# Patient Record
Sex: FEMALE | Race: BLACK OR AFRICAN AMERICAN | Employment: FULL TIME | URBAN - METROPOLITAN AREA
[De-identification: names, ages, dates, MRNs, and addresses within clinical notes are randomized per-mention and may not be internally consistent; named-entity substitution may affect disease eponyms.]

---

## 2017-01-23 ENCOUNTER — OFFICE VISIT (OUTPATIENT)
Dept: SURGERY | Age: 42
End: 2017-01-23

## 2017-01-23 VITALS
DIASTOLIC BLOOD PRESSURE: 70 MMHG | TEMPERATURE: 97.5 F | WEIGHT: 167 LBS | HEIGHT: 68 IN | BODY MASS INDEX: 25.31 KG/M2 | HEART RATE: 85 BPM | RESPIRATION RATE: 17 BRPM | SYSTOLIC BLOOD PRESSURE: 102 MMHG

## 2017-01-23 DIAGNOSIS — E66.3 OVERWEIGHT (BMI 25.0-29.9): Primary | ICD-10-CM

## 2017-01-23 RX ORDER — PHENTERMINE HYDROCHLORIDE 37.5 MG/1
37.5 TABLET ORAL
Qty: 30 TAB | Refills: 0 | Status: SHIPPED | OUTPATIENT
Start: 2017-01-23 | End: 2017-04-14 | Stop reason: DRUGHIGH

## 2017-01-23 NOTE — PROGRESS NOTES
Patient is a 46year old female presenting for follow up weight check. Patient reports she continues taking phentermine and would like to increase dosage. Patient reports having insomnia the first week of taking phentermine and some constipation and dry mouth. Patient has lost 1 lb since last visit 12/2016. Body mass index is 25.39 kg/(m^2).

## 2017-01-23 NOTE — PATIENT INSTRUCTIONS
If you have any question or concerns about today's appointment, the verbal and/or written instructions you were given for follow up care, please call our office at [phone].      [place] [facility], [address]  [place], [address]

## 2017-01-31 NOTE — PROGRESS NOTES
Pt returns after starting phentermine 15mg tabs x past month for ongoing weight management and prevention of regain after excellent weight loss with Marge Horan IGB. She notes experiencing 1 week of insomnia as well as some mild constipation, relieved by Mg+ supplement and increasing fluid. She has lost only 1lb in the past month and requests increase in dose of phentermine. Past Medical History   Diagnosis Date    Thyroid disease      hashimotos     Current Outpatient Prescriptions on File Prior to Visit   Medication Sig Dispense Refill    cholecalciferol, vitamin D3, 2,000 unit tab Take 6,000 Units by mouth daily. No current facility-administered medications on file prior to visit. Visit Vitals    /70    Pulse 85    Temp 97.5 °F (36.4 °C) (Oral)    Resp 17    Ht 5' 8\" (1.727 m)    Wt 75.8 kg (167 lb)    BMI 25.39 kg/m2     Weight Metrics 1/23/2017 12/27/2016 10/14/2016 9/26/2016 5/6/2016 4/14/2016 3/25/2016   Weight 167 lb 168 lb 169 lb 9.6 oz 172 lb 195 lb 208 lb 6 oz 205 lb   BMI 25.39 kg/m2 25.54 kg/m2 25.79 kg/m2 26.15 kg/m2 29.66 kg/m2 31.69 kg/m2 31.18 kg/m2   appears well    A/P: suboptimal response to phentermine 15mg, agree to increase to 37.5mg--half tab x 1 week, then may increase to 1/2 tab bid wit 2nd half no later than noon to avoid insomnia.   F/u 1 month, sooner prn  Counseling>50% of 15 minute visit  Germania Ruggiero PA-C

## 2017-04-14 ENCOUNTER — OFFICE VISIT (OUTPATIENT)
Dept: SURGERY | Age: 42
End: 2017-04-14

## 2017-04-14 VITALS
HEART RATE: 82 BPM | HEIGHT: 68 IN | TEMPERATURE: 98.5 F | OXYGEN SATURATION: 99 % | WEIGHT: 176 LBS | BODY MASS INDEX: 26.67 KG/M2 | SYSTOLIC BLOOD PRESSURE: 114 MMHG | RESPIRATION RATE: 16 BRPM | DIASTOLIC BLOOD PRESSURE: 82 MMHG

## 2017-04-14 DIAGNOSIS — E66.3 OVERWEIGHT (BMI 25.0-29.9): Primary | ICD-10-CM

## 2017-04-14 RX ORDER — PHENTERMINE HYDROCHLORIDE 15 MG/1
30 CAPSULE ORAL
Qty: 60 CAP | Refills: 0 | Status: SHIPPED | OUTPATIENT
Start: 2017-04-14 | End: 2017-09-11

## 2017-04-14 NOTE — MR AVS SNAPSHOT
Visit Information Date & Time Provider Department Dept. Phone Encounter #  
 4/14/2017  3:30 PM [address], PA-C [address] [id number] Your Appointments 5/25/2017  1:30 PM  
Follow Up with Julita Nasim Hardin RALEIGH Angelo 35 ([facility]) Appt Note: 6 week bariatric follow up [address]yes[address] [id number] [address] [facility] Upcoming Health Maintenance Date Due DTaP/Tdap/Td series (1 - Tdap) 6/20/1998 PAP AKA CERVICAL CYTOLOGY 10/24/2014 INFLUENZA AGE 9 TO ADULT 8/1/2016 Allergies as of 4/14/2017  Review Complete On: 4/14/2017 By: [address], PA-C No Known Allergies Current Immunizations  Never Reviewed No immunizations on file. Not reviewed this visit You Were Diagnosed With   
  
 Codes Comments Overweight (BMI 25.0-29.9)    -  Primary ICD-10-CM: R46.6 ICD-9-CM: 278.02 Vitals BP Pulse Temp Resp Height(growth percentile) Weight(growth percentile) 114/82 (BP 1 Location: Left arm, BP Patient Position: Sitting) 82 98.5 °F (36.9 °C) (Oral) 16 5' 8\" (1.727 m) 176 lb (79.8 kg) SpO2 BMI OB Status Smoking Status 99% 26.76 kg/m2 Having regular periods Former Smoker Vitals History BMI and BSA Data Body Mass Index Body Surface Area  
 26.76 kg/m 2 1.96 m 2 Preferred Pharmacy Pharmacy Name Phone [address] [facility], [address] [place] [address]  [phone] Your Updated Medication List  
  
   
This list is accurate as of: 4/14/17  3:49 PM.  Always use your most recent med list.  
  
  
  
  
 cholecalciferol (vitamin D3) 2,000 unit Tab Take 6,000 Units by mouth daily. phentermine 15 mg capsule Commonly known as:  ADIPEX_P  
 Take 2 Caps by mouth every morning. Max Daily Amount: 30 mg. Indications: WT LOSS MGMT, PT WITH BMI 27-29 & WT-RELATED COMORBIDITY Prescriptions Printed Refills  
 phentermine (ADIPEX_P) 15 mg capsule 0 Sig: Take 2 Caps by mouth every morning. Max Daily Amount: 30 mg. Indications: WT LOSS MGMT, PT WITH BMI 27-29 & WT-RELATED COMORBIDITY Class: Print Route: Oral  
  
Introducing [facility] & HEALTH SERVICES! Krzysztof Mckeon introduces [employer] patient portal. Now you can access parts of your medical record, email your doctor's office, and request medication refills online. 1. In your internet browser, go to [email or web address] 2. Click on the First Time User? Click Here link in the Sign In box. You will see the New Member Sign Up page. 3. Enter your [employer] Access Code exactly as it appears below. You will not need to use this code after youve completed the sign-up process. If you do not sign up before the expiration date, you must request a new code. · [employer] Access Code: [email or web address] Expires: 7/13/2017  3:49 PM 
 
4. Enter the last four digits of your Social Security Number (xxxx) and Date of Birth (mm/dd/yyyy) as indicated and click Submit. You will be taken to the next sign-up page. 5. Create a [employer] ID. This will be your [employer] login ID and cannot be changed, so think of one that is secure and easy to remember. 6. Create a [employer] password. You can change your password at any time. 7. Enter your Password Reset Question and Answer. This can be used at a later time if you forget your password. 8. Enter your e-mail address. You will receive e-mail notification when new information is available in [address] 9. Click Sign Up. You can now view and download portions of your medical record. 10. Click the Download Summary menu link to download a portable copy of your medical information.  
 
If you have questions, please visit the Frequently Asked Questions section of the [employer]. Remember, [employer] is NOT to be used for urgent needs. For medical emergencies, dial 911. Now available from your iPhone and Android! Please provide this summary of care documentation to your next provider. Your primary care clinician is listed as [facility] [place]. If you have any questions after today's visit, please call [phone].

## 2017-04-17 NOTE — PROGRESS NOTES
Pt returns for phentermine f/u visit. She hasn't been seen in nearly 2 months and is out of medication. She reports feeling some word finding difficulties on 37.5mg dose tablet and is requesting going back on the 15mg capsule--this was prescribed to her initially, but she did not feel appetite suppression as well and requested increased in dose. She is up 7lbs from her last appt, but she attributes this to starting her menses and retaining fluid. She denies any other SE on phentermine and desires restarting rx  She is 10 months s/p IGB placement and has several RD appts she has not utilized at this point. Past Medical History:   Diagnosis Date    Thyroid disease     hashimotos     Current Outpatient Prescriptions on File Prior to Visit   Medication Sig Dispense Refill    cholecalciferol, vitamin D3, 2,000 unit tab Take 6,000 Units by mouth daily. No current facility-administered medications on file prior to visit.       Weight Metrics 4/14/2017 1/23/2017 12/27/2016 10/14/2016 9/26/2016 5/6/2016 4/14/2016   Weight 176 lb 167 lb 168 lb 169 lb 9.6 oz 172 lb 195 lb 208 lb 6 oz   BMI 26.76 kg/m2 25.39 kg/m2 25.54 kg/m2 25.79 kg/m2 26.15 kg/m2 29.66 kg/m2 31.69 kg/m2     Visit Vitals    /82 (BP 1 Location: Left arm, BP Patient Position: Sitting)    Pulse 82    Temp 98.5 °F (36.9 °C) (Oral)    Resp 16    Ht 5' 8\" (1.727 m)    Wt 79.8 kg (176 lb)    SpO2 99%    BMI 26.76 kg/m2     Appears well    A/P: obesity, s/p recent weight loss with IGB, now on phentermine for continued loss/prevention of regain--agree to retry phentermine 15mg capsules--1-2/day prn appetite  Reviewed importance of low CHO diet and consistent exercise program  Advised she see RD for more detailed MNT--pt not desiring at this point  F/u 4-6 weeks, sooner prn  Germania Ruggiero PA-C

## 2017-05-26 ENCOUNTER — OFFICE VISIT (OUTPATIENT)
Dept: SURGERY | Age: 42
End: 2017-05-26

## 2017-05-26 VITALS
BODY MASS INDEX: 25.91 KG/M2 | WEIGHT: 171 LBS | RESPIRATION RATE: 16 BRPM | SYSTOLIC BLOOD PRESSURE: 128 MMHG | HEIGHT: 68 IN | DIASTOLIC BLOOD PRESSURE: 80 MMHG | HEART RATE: 64 BPM | TEMPERATURE: 97.6 F | OXYGEN SATURATION: 97 %

## 2017-05-26 DIAGNOSIS — E66.3 OVERWEIGHT (BMI 25.0-29.9): Primary | ICD-10-CM

## 2017-05-26 RX ORDER — PHENTERMINE HYDROCHLORIDE 15 MG/1
15 CAPSULE ORAL 2 TIMES DAILY
Qty: 60 CAP | Refills: 0 | Status: SHIPPED | OUTPATIENT
Start: 2017-05-26 | End: 2017-09-11

## 2017-05-26 NOTE — MR AVS SNAPSHOT
Visit Information Date & Time Provider Department Dept. Phone Encounter #  
 5/26/2017  1:00 PM Germania Levi PA-C [address] [id number] Your Appointments 6/26/2017  1:00 PM  
Follow Up with RALEIGH Munroe 33 ([address]) Appt Note: 1 month balloon f/up [address] [facility] Upcoming Health Maintenance Date Due DTaP/Tdap/Td series (1 - Tdap) 6/20/1998 PAP AKA CERVICAL CYTOLOGY 10/24/2014 INFLUENZA AGE 9 TO ADULT 8/1/2017 Allergies as of 5/26/2017  Review Complete On: 5/26/2017 By: Mitch Guan PA-C No Known Allergies Current Immunizations  Never Reviewed No immunizations on file. Not reviewed this visit You Were Diagnosed With   
  
 Codes Comments Overweight (BMI 25.0-29.9)    -  Primary ICD-10-CM: A89.7 ICD-9-CM: 278.02 Vitals BP Pulse Temp Resp Height(growth percentile) Weight(growth percentile) 128/80 64 97.6 °F (36.4 °C) 16 5' 8\" (1.727 m) 171 lb (77.6 kg) SpO2 BMI OB Status Smoking Status 97% 26 kg/m2 Having regular periods Never Smoker BMI and BSA Data Body Mass Index Body Surface Area  
 26 kg/m 2 1.93 m 2 Preferred Pharmacy Pharmacy Name Phone [address] [facility], [address] [place] [address] [phone] Your Updated Medication List  
  
   
This list is accurate as of: 5/26/17  1:58 PM.  Always use your most recent med list.  
  
  
  
  
 cholecalciferol (vitamin D3) 2,000 unit Tab Take 6,000 Units by mouth daily. * phentermine 15 mg capsule Commonly known as:  ADIPEX_P Take 2 Caps by mouth every morning. Max Daily Amount: 30 mg.  Indications: WT LOSS MGMT, PT WITH BMI 27-29 & WT-RELATED COMORBIDITY * phentermine 15 mg capsule Commonly known as:  ADIPEX_P Take 1 Cap by mouth two (2) times a day. Max Daily Amount: 30 mg. Indications: WT LOSS MGMT, PT WITH BMI 27-29 & WT-RELATED COMORBIDITY * Notice: This list has 2 medication(s) that are the same as other medications prescribed for you. Read the directions carefully, and ask your doctor or other care provider to review them with you. Prescriptions Printed Refills  
 phentermine (ADIPEX_P) 15 mg capsule 0 Sig: Take 1 Cap by mouth two (2) times a day. Max Daily Amount: 30 mg. Indications: WT LOSS MGMT, PT WITH BMI 27-29 & WT-RELATED COMORBIDITY Class: Print Route: Oral  
  
Introducing [facility] & HEALTH SERVICES! Cee Cisneros introduces [employer] patient portal. Now you can access parts of your medical record, email your doctor's office, and request medication refills online. 1. In your internet browser, go to [email or web address] 2. Click on the First Time User? Click Here link in the Sign In box. You will see the New Member Sign Up page. 3. Enter your [employer] Access Code exactly as it appears below. You will not need to use this code after youve completed the sign-up process. If you do not sign up before the expiration date, you must request a new code. · [employer] Access Code: [email or web address] Expires: 7/13/2017  3:49 PM 
 
4. Enter the last four digits of your Social Security Number (xxxx) and Date of Birth (mm/dd/yyyy) as indicated and click Submit. You will be taken to the next sign-up page. 5. Create a [employer] ID. This will be your [employer] login ID and cannot be changed, so think of one that is secure and easy to remember. 6. Create a [employer] password. You can change your password at any time. 7. Enter your Password Reset Question and Answer. This can be used at a later time if you forget your password. 8. Enter your e-mail address. You will receive e-mail notification when new information is available in [address] 9. Click Sign Up. You can now view and download portions of your medical record. 10. Click the Download Summary menu link to download a portable copy of your medical information. If you have questions, please visit the Frequently Asked Questions section of the [employer] website. Remember, [employer] is NOT to be used for urgent needs. For medical emergencies, dial 911. Now available from your iPhone and Android! Please provide this summary of care documentation to your next provider. Your primary care clinician is listed as [facility] [place]. If you have any questions after today's visit, please call [phone].

## 2017-05-26 NOTE — PROGRESS NOTES
Here for monthly phentermine f/u. Has been taking capsules 15mg bid with good appetite suppression and no SE other than dry mouth. She has lost most of the weight she had gained at last visit. She has been sporadic with low CHO diet, but making efforts to eat meats/veggies/fats    Past Medical History:   Diagnosis Date    Thyroid disease     hashimotos     Current Outpatient Prescriptions on File Prior to Visit   Medication Sig Dispense Refill    phentermine (ADIPEX_P) 15 mg capsule Take 2 Caps by mouth every morning. Max Daily Amount: 30 mg. Indications: WT LOSS MGMT, PT WITH BMI 27-29 & WT-RELATED COMORBIDITY 60 Cap 0    cholecalciferol, vitamin D3, 2,000 unit tab Take 6,000 Units by mouth daily. No current facility-administered medications on file prior to visit. Weight Metrics 5/26/2017 4/14/2017 1/23/2017 12/27/2016 10/14/2016 9/26/2016 5/6/2016   Weight 171 lb 176 lb 167 lb 168 lb 169 lb 9.6 oz 172 lb 195 lb   BMI 26 kg/m2 26.76 kg/m2 25.39 kg/m2 25.54 kg/m2 25.79 kg/m2 26.15 kg/m2 29.66 kg/m2     Visit Vitals    /80    Pulse 64    Temp 97.6 °F (36.4 °C)    Resp 16    Ht 5' 8\" (1.727 m)    Wt 77.6 kg (171 lb)    SpO2 97%    BMI 26 kg/m2     Appears well    A/P: doing well on phentermine capsules 15mg bid--ok to rf.   Advise consistent low CHO (aim for <60gm/day)  Cont exercise program--suggest measurements  F/u 1 month, sooner prn  Counseling>50% of 15 minute visit  Germania Ruggiero PA-C

## 2017-06-26 ENCOUNTER — OFFICE VISIT (OUTPATIENT)
Dept: SURGERY | Age: 42
End: 2017-06-26

## 2017-06-26 VITALS
BODY MASS INDEX: 26.86 KG/M2 | TEMPERATURE: 97.6 F | HEART RATE: 74 BPM | OXYGEN SATURATION: 99 % | SYSTOLIC BLOOD PRESSURE: 110 MMHG | WEIGHT: 177.2 LBS | DIASTOLIC BLOOD PRESSURE: 70 MMHG | HEIGHT: 68 IN | RESPIRATION RATE: 16 BRPM

## 2017-06-26 DIAGNOSIS — E66.3 OVERWEIGHT (BMI 25.0-29.9): Primary | ICD-10-CM

## 2017-06-26 RX ORDER — PHENTERMINE HYDROCHLORIDE 15 MG/1
15 CAPSULE ORAL 2 TIMES DAILY
Qty: 60 CAP | Refills: 0 | Status: SHIPPED | OUTPATIENT
Start: 2017-06-26 | End: 2017-09-11

## 2017-06-26 NOTE — PROGRESS NOTES
Pt returns for monthly phentermine f/u. Vacationed in [place] and indulged in any food she desired plus EtOH and weight up 6lbs this month. She denies SE on 15mg capsules bid. She had been following low CHO/ moderate fat and protein diet and \"felt great\". Since changing diet back to high CHO she notices more abd bloating, skin change and decreased energy. She is eager to restart low CHO diet and exercise program. She desires rf phentermine at least for 1 more month to assist with getting back on track. Past Medical History:   Diagnosis Date    Thyroid disease     hashimotos     Current Outpatient Prescriptions on File Prior to Visit   Medication Sig Dispense Refill    phentermine (ADIPEX_P) 15 mg capsule Take 1 Cap by mouth two (2) times a day. Max Daily Amount: 30 mg. Indications: WT LOSS MGMT, PT WITH BMI 27-29 & WT-RELATED COMORBIDITY 60 Cap 0    phentermine (ADIPEX_P) 15 mg capsule Take 2 Caps by mouth every morning. Max Daily Amount: 30 mg. Indications: WT LOSS MGMT, PT WITH BMI 27-29 & WT-RELATED COMORBIDITY 60 Cap 0    cholecalciferol, vitamin D3, 2,000 unit tab Take 6,000 Units by mouth daily. No current facility-administered medications on file prior to visit. Weight Metrics 6/26/2017 5/26/2017 4/14/2017 1/23/2017 12/27/2016 10/14/2016 9/26/2016   Weight 177 lb 3.2 oz 171 lb 176 lb 167 lb 168 lb 169 lb 9.6 oz 172 lb   BMI 26.94 kg/m2 26 kg/m2 26.76 kg/m2 25.39 kg/m2 25.54 kg/m2 25.79 kg/m2 26.15 kg/m2     Visit Vitals    /70 (BP 1 Location: Left arm, BP Patient Position: Sitting)    Pulse 74    Temp 97.6 °F (36.4 °C) (Oral)    Resp 16    Ht 5' 8\" (1.727 m)    Wt 80.4 kg (177 lb 3.2 oz)    SpO2 99%    BMI 26.94 kg/m2     Appears well    A/P: monthly phentermine f/u--weight gain this month. OK for rf 15mg bid capsules. Encouraged restart low CHO diet as felt noticeably better overall.  If weight decreased next month, will consider reduced dose phentermine 15mg every day  F/u 1 month, sooner kris Ruggiero PA-C

## 2017-06-26 NOTE — PROGRESS NOTES
Patient is a 38 female who presents for a bariatric follow up evaluation for an EGD with placement of Oberra intra-gastric balloon done on 04/14/16 & removal done on 10/14/16. Her BMI today is 26.94 kg (m^2). She is currently taking Phentermine & tolerating it well. 1. Have you been to the ER, urgent care clinic since your last visit? Hospitalized since your last visit? No    2. Have you seen or consulted any other health care providers outside of the [address] [place] [address] since your last visit? Include any pap smears or colon screening.  No

## 2017-07-28 ENCOUNTER — OFFICE VISIT (OUTPATIENT)
Dept: SURGERY | Age: 42
End: 2017-07-28

## 2017-07-28 VITALS
HEART RATE: 72 BPM | TEMPERATURE: 98.6 F | RESPIRATION RATE: 16 BRPM | SYSTOLIC BLOOD PRESSURE: 128 MMHG | DIASTOLIC BLOOD PRESSURE: 82 MMHG | WEIGHT: 172 LBS | HEIGHT: 68 IN | BODY MASS INDEX: 26.07 KG/M2

## 2017-07-28 DIAGNOSIS — E66.3 OVERWEIGHT (BMI 25.0-29.9): Primary | ICD-10-CM

## 2017-07-28 RX ORDER — PHENTERMINE HYDROCHLORIDE 15 MG/1
15 CAPSULE ORAL 2 TIMES DAILY
Qty: 60 CAP | Refills: 0 | Status: SHIPPED | OUTPATIENT
Start: 2017-07-28 | End: 2017-09-11

## 2017-07-28 NOTE — PROGRESS NOTES
Ms. Bryon Silverman returns to clinic for her monthly follow-up while on phentermine for obesity management. She continues to take phentermine capsules 15 mg twice daily and denies side effects. At her last visit her weight was up, as she was on vacation and went off the plan. Today she notes increasing her physical activity to include body pump and other aerobic exercise classes, as well as following the low-carb diet as closely as possible. Her macronutrient calculator on her phone demonstrates an average daily carbohydrate gram intake of 70 g per day. She has lost 5 pounds this month and is pleased with this, however overall she is frustrated that she is not reaching her goal weight of 160 pounds. She has lots of questions about other available medications for weight loss. Past Medical History:   Diagnosis Date    Thyroid disease     hashimotos     Current Outpatient Prescriptions on File Prior to Visit   Medication Sig Dispense Refill    phentermine (ADIPEX_P) 15 mg capsule Take 2 Caps by mouth every morning. Max Daily Amount: 30 mg. Indications: WT LOSS MGMT, PT WITH BMI 27-29 & WT-RELATED COMORBIDITY 60 Cap 0    cholecalciferol, vitamin D3, 2,000 unit tab Take 6,000 Units by mouth daily.  phentermine (ADIPEX_P) 15 mg capsule Take 1 Cap by mouth two (2) times a day. Max Daily Amount: 30 mg. Indications: WT LOSS MGMT, PT WITH BMI 27-29 & WT-RELATED COMORBIDITY 60 Cap 0    phentermine (ADIPEX_P) 15 mg capsule Take 1 Cap by mouth two (2) times a day. Max Daily Amount: 30 mg. Indications: WT LOSS MGMT, PT WITH BMI 27-29 & WT-RELATED COMORBIDITY 60 Cap 0     No current facility-administered medications on file prior to visit.       Weight Metrics 7/28/2017 6/26/2017 5/26/2017 4/14/2017 1/23/2017 12/27/2016 10/14/2016   Weight 172 lb 177 lb 3.2 oz 171 lb 176 lb 167 lb 168 lb 169 lb 9.6 oz   BMI 26.15 kg/m2 26.94 kg/m2 26 kg/m2 26.76 kg/m2 25.39 kg/m2 25.54 kg/m2 25.79 kg/m2     Visit Vitals    /82    Pulse 72    Temp 98.6 °F (37 °C)    Resp 16    Ht 5' 8\" (1.727 m)    Wt 78 kg (172 lb)    BMI 26.15 kg/m2     Appears well    A/P: Monthly follow-up for phentermine management, doing well this month sticking to low-carb diet and aggressive exercise program.  Lengthy discussion reviewing all available medications for weight loss, including mechanisms of action and potential side effects, as well as costs. Patient desires holding off on changing medications at this time, but agrees to accept package insert materials regarding Belviq. I agreed to give her refill of phentermine 15 mg twice daily for this month, and we will discuss the possibility of reducing phentermine to 15 mg daily and adding Belviq 20 mg extended release next month if she desires.   Counseling greater than 50% of 15 minute visit  Follow-up one month sooner as needed  Germania Ruggiero PA-C

## 2017-08-25 ENCOUNTER — OFFICE VISIT (OUTPATIENT)
Dept: SURGERY | Age: 42
End: 2017-08-25

## 2017-08-25 VITALS
BODY MASS INDEX: 26.52 KG/M2 | HEART RATE: 75 BPM | TEMPERATURE: 98.7 F | HEIGHT: 68 IN | WEIGHT: 175 LBS | RESPIRATION RATE: 16 BRPM | OXYGEN SATURATION: 99 % | SYSTOLIC BLOOD PRESSURE: 100 MMHG | DIASTOLIC BLOOD PRESSURE: 72 MMHG

## 2017-08-25 DIAGNOSIS — E66.3 OVERWEIGHT (BMI 25.0-29.9): Primary | ICD-10-CM

## 2017-08-25 PROCEDURE — 99213 OFFICE O/P EST LOW 20 MIN: CPT | Performed by: PHYSICIAN ASSISTANT

## 2017-08-25 NOTE — PROGRESS NOTES
Ms. Nimisha Cavanaugh returns to the office for her monthly follow-up visit/phentermine medication check. She has been taking phentermine for several months, and decided 2 weeks ago that she did not want to take it any longer. She reports feeling urinary urgency and feeling mentally scattered and unfocused while taking medication. She has never reported the side effects before but believes they are related to the phentermine, as she has not experienced them for the past 2 weeks since stopping the medication. She remains frustrated with her stalled weight loss, and desires losing an additional 10-15 pounds. Patient had an intragastric balloon placed April 14, 2016 with starting weight of 205 pounds, and achieved a jamil of 172 6 months later. We started working together since that time with the goal of prevention of weight regain. Ms. Nimisha Cavanaugh continues to follow a low carbohydrate diet, generally keeping her daily carb grams under 50, and on her \"bad days\" under 100. In addition she is exercising regularly, doing cardio and resistance training. She feels leaner overall, and has no medical problems. She is asking about additional medications for weight loss including Saxenda. Past Medical History:   Diagnosis Date    Thyroid disease     hashimotos     Patient Active Problem List   Diagnosis Code    Overweight (BMI 25.0-29. 9) E66.3     Current Outpatient Prescriptions on File Prior to Visit   Medication Sig Dispense Refill    phentermine (ADIPEX_P) 15 mg capsule Take 1 Cap by mouth two (2) times a day. Max Daily Amount: 30 mg. Indications: WT LOSS MGMT, PT WITH BMI 27-29 & WT-RELATED COMORBIDITY 60 Cap 0    phentermine (ADIPEX_P) 15 mg capsule Take 1 Cap by mouth two (2) times a day. Max Daily Amount: 30 mg. Indications: WT LOSS MGMT, PT WITH BMI 27-29 & WT-RELATED COMORBIDITY 60 Cap 0    phentermine (ADIPEX_P) 15 mg capsule Take 1 Cap by mouth two (2) times a day. Max Daily Amount: 30 mg.  Indications: WT LOSS MGMT, PT WITH BMI 27-29 & WT-RELATED COMORBIDITY 60 Cap 0    phentermine (ADIPEX_P) 15 mg capsule Take 2 Caps by mouth every morning. Max Daily Amount: 30 mg. Indications: WT LOSS MGMT, PT WITH BMI 27-29 & WT-RELATED COMORBIDITY 60 Cap 0    cholecalciferol, vitamin D3, 2,000 unit tab Take 6,000 Units by mouth daily. No current facility-administered medications on file prior to visit. Weight Metrics 8/25/2017 7/28/2017 6/26/2017 5/26/2017 4/14/2017 1/23/2017 12/27/2016   Weight 175 lb 172 lb 177 lb 3.2 oz 171 lb 176 lb 167 lb 168 lb   BMI 26.61 kg/m2 26.15 kg/m2 26.94 kg/m2 26 kg/m2 26.76 kg/m2 25.39 kg/m2 25.54 kg/m2     Visit Vitals    /72    Pulse 75    Temp 98.7 °F (37.1 °C) (Oral)    Resp 16    Ht 5' 8\" (1.727 m)    Wt 79.4 kg (175 lb)    SpO2 99%    BMI 26.61 kg/m2     Appears well    A/P: Ms. Ramses Murdock is approximately 1 year status post removal of intragastric balloon, with maintenance of lost weight. She has decided to stop taking phentermine for weight loss due to side effects, and I support her in this decision. We had a lengthy discussion regarding alternative pharmacotherapy for weight loss, however the financial cost, mechanism of action, and side effect profile are not warranted by her current BMI of 26. I have applauded her in changing her lifestyle, and have encouraged her to continue her diet and exercise efforts, with the potential for this to be sufficient in weight maintenance. We may consider DEXA body composition measurements when they become available at our institution, as I feel she may have a normal body composition. We agreed to follow-up in 2 months for a weight check, with possible reintroduction of pharmacotherapy if necessary.   Counseling greater than 50% of 15 minute visit  Germania Ruggiero PA-C

## 2017-09-11 ENCOUNTER — OFFICE VISIT (OUTPATIENT)
Dept: SURGERY | Age: 42
End: 2017-09-11

## 2017-09-11 VITALS
TEMPERATURE: 98.1 F | BODY MASS INDEX: 26.98 KG/M2 | DIASTOLIC BLOOD PRESSURE: 82 MMHG | SYSTOLIC BLOOD PRESSURE: 112 MMHG | HEIGHT: 68 IN | RESPIRATION RATE: 16 BRPM | WEIGHT: 178 LBS | HEART RATE: 68 BPM

## 2017-09-11 DIAGNOSIS — E66.3 OVERWEIGHT (BMI 25.0-29.9): Primary | ICD-10-CM

## 2017-09-11 RX ORDER — BISMUTH SUBSALICYLATE 262 MG
1 TABLET,CHEWABLE ORAL DAILY
COMMUNITY

## 2017-09-14 NOTE — PROGRESS NOTES
Patient returns for obesity management. We have been working together for many months using pharmacotherapy for weight loss (phentermine). Patient has discontinued use of phentermine and has requested initiation of Saxenda. She has done extensive research on this medication, is aware of its costs, as well as side effects, and desires starting therapy. There is been no change to her past medical history, and she denies history of thyroid cancer in herself or any family members. She is aware this requires subcutaneous injections, and is here for teaching. Past Medical History:   Diagnosis Date    Thyroid disease     hashimotos     Patient Active Problem List   Diagnosis Code    Overweight (BMI 25.0-29. 9) E66.3     Current Outpatient Prescriptions on File Prior to Visit   Medication Sig Dispense Refill    cholecalciferol, vitamin D3, 2,000 unit tab Take 6,000 Units by mouth daily. No current facility-administered medications on file prior to visit. Weight Metrics 9/11/2017 8/25/2017 7/28/2017 6/26/2017 5/26/2017 4/14/2017 1/23/2017   Weight 178 lb 175 lb 172 lb 177 lb 3.2 oz 171 lb 176 lb 167 lb   BMI 27.06 kg/m2 26.61 kg/m2 26.15 kg/m2 26.94 kg/m2 26 kg/m2 26.76 kg/m2 25.39 kg/m2     Visit Vitals    /82    Pulse 68    Temp 98.1 °F (36.7 °C)    Resp 16    Ht 5' 8\" (1.727 m)    Wt 80.7 kg (178 lb)    BMI 27.06 kg/m2     Appears well    A/P: Patient with obesity, okay to start Saxenda 0.6 mg subcu daily ×1 week, with ability to increase dose by 0.6 mg per day for a week at a time (max dose 3 mg per day). Reviewed dose-related nausea--patient aware  Did hands-on teaching of subcutaneous injection with patient.   She successfully self injected sterile saline into the subcu tissue of her abdomen without complication  Rx given  10% of 30 minute visit spent in counseling and teaching  Follow-up 1 month, sooner as needed  Germania Ruggiero PA-C

## 2017-11-03 ENCOUNTER — OFFICE VISIT (OUTPATIENT)
Dept: SURGERY | Age: 42
End: 2017-11-03

## 2017-11-03 VITALS
RESPIRATION RATE: 16 BRPM | WEIGHT: 170 LBS | HEART RATE: 64 BPM | SYSTOLIC BLOOD PRESSURE: 102 MMHG | HEIGHT: 68 IN | BODY MASS INDEX: 25.76 KG/M2 | TEMPERATURE: 97.7 F | DIASTOLIC BLOOD PRESSURE: 74 MMHG

## 2017-11-03 DIAGNOSIS — E66.3 OVERWEIGHT (BMI 25.0-29.9): Primary | ICD-10-CM

## 2017-11-06 ENCOUNTER — IMPORTED ENCOUNTER (OUTPATIENT)
Dept: URBAN - METROPOLITAN AREA CLINIC 1 | Facility: CLINIC | Age: 42
End: 2017-11-06

## 2017-11-06 PROBLEM — H52.13: Noted: 2017-11-06

## 2017-11-06 PROCEDURE — S0621 ROUTINE OPHTHALMOLOGICAL EXA: HCPCS

## 2017-11-06 NOTE — PROGRESS NOTES
Ms. Tera Hill returns to the office for medication check. She started [place] for obesity management, and prevention of weight regain after intragastric balloon 1 month ago. She started with the lowest starting dose and has worked up to 3 mg daily subcu. She reports minimal nausea, however feels decreased appetite and has lost 8 pounds in the last month. She is very pleased with this medication and desires continuation. Past Medical History:   Diagnosis Date    Thyroid disease     hashimotos     Current Outpatient Prescriptions on File Prior to Visit   Medication Sig Dispense Refill    multivitamin (ONE A DAY) tablet Take 1 Tab by mouth daily.  liraglutide (SAXENDA) 3 mg/0.5 mL (18 mg/3 mL) pen 0.1 mL by SubCUTAneous route daily. Indications: WT LOSS MGMT, PT WITH BMI 27-29 & WT-RELATED COMORBIDITY 1 Adjustable Dose Pre-filled Pen Syringe 3    cholecalciferol, vitamin D3, 2,000 unit tab Take 6,000 Units by mouth daily. No current facility-administered medications on file prior to visit. Weight Metrics 11/3/2017 9/11/2017 8/25/2017 7/28/2017 6/26/2017 5/26/2017 4/14/2017   Weight 170 lb 178 lb 175 lb 172 lb 177 lb 3.2 oz 171 lb 176 lb   BMI 25.85 kg/m2 27.06 kg/m2 26.61 kg/m2 26.15 kg/m2 26.94 kg/m2 26 kg/m2 26.76 kg/m2     Visit Vitals    /74    Pulse 64    Temp 97.7 °F (36.5 °C)    Resp 16    Ht 5' 8\" (1.727 m)    Wt 77.1 kg (170 lb)    BMI 25.85 kg/m2     Appears well    A/P: Obesity, currently controlled with daily Saxenda--okay to continue Rx. Reinforced low carbohydrate diet and exercise for at least 150 minutes a week.   Follow-up 6-8 weeks, weight check, sooner as needed  Counseling greater than 50% of 15 minute visit  Germania Ruggiero PA-C

## 2017-11-06 NOTE — PATIENT DISCUSSION
1. Myopia: Rx was given for correction if indicated and requested. 2. Return for an appointment in 1 year for 40 and Contact lens check. with Dr. Lisa Paredes.

## 2017-12-20 ENCOUNTER — OFFICE VISIT (OUTPATIENT)
Dept: SURGERY | Age: 42
End: 2017-12-20

## 2017-12-20 VITALS
HEART RATE: 68 BPM | DIASTOLIC BLOOD PRESSURE: 78 MMHG | TEMPERATURE: 98.4 F | RESPIRATION RATE: 16 BRPM | HEIGHT: 68 IN | BODY MASS INDEX: 25.76 KG/M2 | WEIGHT: 170 LBS | SYSTOLIC BLOOD PRESSURE: 112 MMHG

## 2017-12-20 DIAGNOSIS — E66.3 OVERWEIGHT (BMI 25.0-29.9): Primary | ICD-10-CM

## 2017-12-20 NOTE — PROGRESS NOTES
Patient returns for follow-up/weight check, last seen 11/3/2017 with excellent result on Saxenda 3 mg daily subcu for obesity. She notes continuing to take medication without any complications or problems. She has been weight stable since our last appointment, and attributes this to being unable to exercise since having a breast surgery early November 2017. She notes if she eats too much or has increased portion sizes she gets nauseated, and therefore stops before she is overly full. She is very pleased with this medication and desires continuing. Past Medical History:   Diagnosis Date    Thyroid disease     hashimotos     Patient Active Problem List   Diagnosis Code    Overweight (BMI 25.0-29. 9) E66.3     Weight Metrics 12/20/2017 11/3/2017 9/11/2017 8/25/2017 7/28/2017 6/26/2017 5/26/2017   Weight 170 lb 170 lb 178 lb 175 lb 172 lb 177 lb 3.2 oz 171 lb   BMI 25.85 kg/m2 25.85 kg/m2 27.06 kg/m2 26.61 kg/m2 26.15 kg/m2 26.94 kg/m2 26 kg/m2     Visit Vitals    /78    Pulse 68    Temp 98.4 °F (36.9 °C)    Resp 16    Ht 5' 8\" (1.727 m)    Wt 77.1 kg (170 lb)    BMI 25.85 kg/m2     Appears well    A/P: Excellent continued result with weight loss on Saxenda 3 mg subcu daily. Patient weight stable, but unable to exercise due to recent breast surgery. Okay to continue Rx  Reinforced importance of following low carbohydrate diet, and achieving 150 minutes a week of exercise when able to do so.   Counseling greater than 50% of 15 minute visit  Follow-up 2-3 months, sooner as needed  Germania Ruggiero PA-C

## 2018-04-13 ENCOUNTER — OFFICE VISIT (OUTPATIENT)
Dept: SURGERY | Age: 43
End: 2018-04-13

## 2018-04-13 VITALS
RESPIRATION RATE: 17 BRPM | TEMPERATURE: 98.7 F | HEIGHT: 68 IN | HEART RATE: 73 BPM | OXYGEN SATURATION: 99 % | DIASTOLIC BLOOD PRESSURE: 68 MMHG | WEIGHT: 173 LBS | SYSTOLIC BLOOD PRESSURE: 132 MMHG | BODY MASS INDEX: 26.22 KG/M2

## 2018-04-13 DIAGNOSIS — E66.3 OVERWEIGHT (BMI 25.0-29.9): Primary | ICD-10-CM

## 2018-04-13 NOTE — PROGRESS NOTES
Continues to do well on Saxenda 3mg every day Sub q injection--denies SE or complications. 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 Diet and exercise habits are appropriate and consistent. She desires remaining on Saxenda for time being. Past Medical History:   Diagnosis Date    Thyroid disease     hashimotos     Patient Active Problem List   Diagnosis Code    Overweight (BMI 25.0-29. 9) E66.3     Weight Metrics 4/13/2018 12/20/2017 11/3/2017 9/11/2017 8/25/2017 7/28/2017 6/26/2017   Weight 173 lb 170 lb 170 lb 178 lb 175 lb 172 lb 177 lb 3.2 oz   BMI 26.3 kg/m2 25.85 kg/m2 25.85 kg/m2 27.06 kg/m2 26.61 kg/m2 26.15 kg/m2 26.94 kg/m2     Current Outpatient Prescriptions on File Prior to Visit   Medication Sig Dispense Refill    multivitamin (ONE A DAY) tablet Take 1 Tab by mouth daily.  liraglutide (SAXENDA) 3 mg/0.5 mL (18 mg/3 mL) pen 0.1 mL by SubCUTAneous route daily. Indications: WT LOSS MGMT, PT WITH BMI 27-29 & WT-RELATED COMORBIDITY 1 Adjustable Dose Pre-filled Pen Syringe 3    cholecalciferol, vitamin D3, 2,000 unit tab Take 6,000 Units by mouth daily. No current facility-administered medications on file prior to visit.       Visit Vitals    /68    Pulse 73    Temp 98.7 °F (37.1 °C) (Oral)    Resp 17    Ht 5' 8\" (1.727 m)    Wt 78.5 kg (173 lb)    SpO2 99%    BMI 26.3 kg/m2     Appears well    A/P: Doing well on Saxenda long term s/p Intragastric balloon placement and removal. Cont low CHO diet and exercise 150 minutes/wk  Sent rf Saxenda 3mg every day to pt's pharmacy rf x 3  Counseling>50% of 15 minute visit  F/u 3 months, sooner prn  Germania Ruggiero PA-C

## 2018-11-12 ENCOUNTER — IMPORTED ENCOUNTER (OUTPATIENT)
Dept: URBAN - METROPOLITAN AREA CLINIC 1 | Facility: CLINIC | Age: 43
End: 2018-11-12

## 2018-11-12 PROBLEM — H52.13: Noted: 2018-11-12

## 2018-11-12 PROCEDURE — S0621 ROUTINE OPHTHALMOLOGICAL EXA: HCPCS

## 2018-11-12 NOTE — PATIENT DISCUSSION
1. Myopia: Rx was given for correction if indicated and requested. 2. [place] OU-REC patient to increase use of AT up to QID w/ or w/out CLS 3. Return for an appointment in 1 year for 40 and Contact lens check. with Dr. Christa Cartagena.

## 2019-02-11 ENCOUNTER — OFFICE VISIT (OUTPATIENT)
Dept: SURGERY | Age: 44
End: 2019-02-11

## 2019-02-11 VITALS
TEMPERATURE: 95.9 F | BODY MASS INDEX: 26.98 KG/M2 | DIASTOLIC BLOOD PRESSURE: 79 MMHG | WEIGHT: 178 LBS | HEIGHT: 68 IN | SYSTOLIC BLOOD PRESSURE: 127 MMHG | OXYGEN SATURATION: 99 % | HEART RATE: 74 BPM

## 2019-02-11 DIAGNOSIS — D64.9 ANEMIA, UNSPECIFIED TYPE: ICD-10-CM

## 2019-02-11 DIAGNOSIS — Z71.3 WEIGHT LOSS COUNSELING, ENCOUNTER FOR: ICD-10-CM

## 2019-02-11 DIAGNOSIS — Z72.4 INAPPROPRIATE DIET AND EATING HABITS: ICD-10-CM

## 2019-02-11 DIAGNOSIS — E66.3 OVERWEIGHT (BMI 25.0-29.9): Primary | ICD-10-CM

## 2019-02-11 DIAGNOSIS — R53.83 FATIGUE, UNSPECIFIED TYPE: ICD-10-CM

## 2019-02-11 NOTE — PROGRESS NOTES
Shirley Treadwell is a 41 y.o. female (: 1975) presenting to address:    Chief Complaint   Patient presents with    Weight Management       Medication list and allergies have been reviewed with Shirley Treadwell and updated as of today's date. I have gone over all Medical, Surgical and Social History with Shirley Treadwell and updated/added the information accordingly.

## 2019-02-11 NOTE — PROGRESS NOTES
Had taken self off of Saxenda and gained 15 lbs in 4 months, at which time she restarted and has been successful in losing the weight she has previously gained. She is now just 5lbs up from her last weight with us. She was last seen by HUGO Ruggiero. She notes she continues to do well on Saxenda 3mg every day Sub q injection--denies SE or complications. When she restarted she did increase her dose gradually as directions instructed and has mild nausea that was tolerable. She has resumed diet and exercise habits that appropriate and consistent with what she was doing before. 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 She desires a refill for Saxenda. Past Medical History:   Diagnosis Date    Thyroid disease     hashimotos     Patient Active Problem List   Diagnosis Code    Overweight (BMI 25.0-29. 9) E66.3     Weight Metrics 2/11/2019 4/13/2018 12/20/2017 11/3/2017 9/11/2017 8/25/2017 7/28/2017   Weight 178 lb 173 lb 170 lb 170 lb 178 lb 175 lb 172 lb   BMI 27.06 kg/m2 26.3 kg/m2 25.85 kg/m2 25.85 kg/m2 27.06 kg/m2 26.61 kg/m2 26.15 kg/m2     Current Outpatient Medications on File Prior to Visit   Medication Sig Dispense Refill    pen needle, diabetic (NOVOFINE PLUS) 32 gauge x 1/6\" ndle Needle to be used for rx of saxenda 100 Pen Needle 0    multivitamin (ONE A DAY) tablet Take 1 Tab by mouth daily.  cholecalciferol, vitamin D3, 2,000 unit tab Take 6,000 Units by mouth daily.  liraglutide (SAXENDA) 3 mg/0.5 mL (18 mg/3 mL) pen 0.5 mL by SubCUTAneous route daily. Indications: WT LOSS MGMT, PT WITH BMI 27-29 & WT-RELATED COMORBIDITY 1 Adjustable Dose Pre-filled Pen Syringe 3    liraglutide (SAXENDA) 3 mg/0.5 mL (18 mg/3 mL) pen 0.1 mL by SubCUTAneous route daily. Indications: WT LOSS MGMT, PT WITH BMI 27-29 & WT-RELATED COMORBIDITY 1 Adjustable Dose Pre-filled Pen Syringe 3     No current facility-administered medications on file prior to visit.       Review of Systems   Gastrointestinal: Positive for nausea. Negative for abdominal pain, constipation, diarrhea, heartburn and vomiting. All other systems reviewed and are negative. Visit Vitals  /79 (BP 1 Location: Right arm, BP Patient Position: Sitting)   Pulse 74   Temp 95.9 °F (35.5 °C) (Oral)   Ht 5' 8\" (1.727 m)   Wt 80.7 kg (178 lb)   SpO2 99%   BMI 27.06 kg/m²     Physical Exam   Constitutional: She is oriented to person, place, and time and well-developed, well-nourished, and in no distress. HENT:   Head: Normocephalic. Cardiovascular: Normal rate and normal heart sounds. Pulmonary/Chest: Effort normal and breath sounds normal.   Abdominal: Soft. Bowel sounds are normal. She exhibits no distension. There is no tenderness. Musculoskeletal: Normal range of motion. Neurological: She is alert and oriented to person, place, and time. Skin: Skin is warm and dry. Psychiatric: Affect normal.   Nursing note and vitals reviewed. A/P: Doing well on Saxenda long term s/p Intragastric balloon placement and removal. Cont low CHO diet and exercise 150 minutes/wk  Sent rf Saxenda 3mg every day to pt's pharmacy rf x 3  Counseling>50% of 30 minute visit  Labs pending.      F/u 3 months, sooner prn  Zhou Marroquin NP

## 2019-02-16 ENCOUNTER — HOSPITAL ENCOUNTER (OUTPATIENT)
Dept: LAB | Age: 44
Discharge: HOME | End: 2019-02-16
Payer: COMMERCIAL

## 2019-02-16 DIAGNOSIS — Z71.3 WEIGHT LOSS COUNSELING, ENCOUNTER FOR: ICD-10-CM

## 2019-02-16 DIAGNOSIS — E66.3 OVERWEIGHT (BMI 25.0-29.9): ICD-10-CM

## 2019-02-16 DIAGNOSIS — R53.83 FATIGUE, UNSPECIFIED TYPE: ICD-10-CM

## 2019-02-16 DIAGNOSIS — D64.9 ANEMIA, UNSPECIFIED TYPE: ICD-10-CM

## 2019-02-16 DIAGNOSIS — Z72.4 INAPPROPRIATE DIET AND EATING HABITS: ICD-10-CM

## 2019-02-16 LAB
ALBUMIN SERPL-MCNC: 3.9 G/DL (ref 3.4–5)
ALBUMIN/GLOB SERPL: 1.1 {RATIO} (ref 0.8–1.7)
ALP SERPL-CCNC: 64 U/L (ref 45–117)
ALT SERPL-CCNC: 23 U/L (ref 13–56)
ANION GAP SERPL CALC-SCNC: 7 MMOL/L (ref 3–18)
AST SERPL-CCNC: 13 U/L (ref 15–37)
BASOPHILS # BLD: 0 K/UL (ref 0–0.1)
BASOPHILS NFR BLD: 0 % (ref 0–2)
BILIRUB SERPL-MCNC: 0.3 MG/DL (ref 0.2–1)
BUN SERPL-MCNC: 8 MG/DL (ref 7–18)
BUN/CREAT SERPL: 11 (ref 12–20)
CALCIUM SERPL-MCNC: 8.7 MG/DL (ref 8.5–10.1)
CHLORIDE SERPL-SCNC: 109 MMOL/L (ref 100–108)
CO2 SERPL-SCNC: 26 MMOL/L (ref 21–32)
CREAT SERPL-MCNC: 0.71 MG/DL (ref 0.6–1.3)
DIFFERENTIAL METHOD BLD: NORMAL
EOSINOPHIL # BLD: 0.1 K/UL (ref 0–0.4)
EOSINOPHIL NFR BLD: 2 % (ref 0–5)
ERYTHROCYTE [DISTWIDTH] IN BLOOD BY AUTOMATED COUNT: 12.9 % (ref 11.6–14.5)
FERRITIN SERPL-MCNC: 25 NG/ML (ref 8–388)
FOLATE SERPL-MCNC: >20 NG/ML (ref 3.1–17.5)
GLOBULIN SER CALC-MCNC: 3.4 G/DL (ref 2–4)
GLUCOSE SERPL-MCNC: 77 MG/DL (ref 74–99)
HCT VFR BLD AUTO: 40.4 % (ref 35–45)
HGB BLD-MCNC: 13 G/DL (ref 12–16)
IRON SERPL-MCNC: 66 UG/DL (ref 50–175)
LYMPHOCYTES # BLD: 1.5 K/UL (ref 0.9–3.6)
LYMPHOCYTES NFR BLD: 21 % (ref 21–52)
MAGNESIUM SERPL-MCNC: 2.1 MG/DL (ref 1.6–2.6)
MCH RBC QN AUTO: 28.4 PG (ref 24–34)
MCHC RBC AUTO-ENTMCNC: 32.2 G/DL (ref 31–37)
MCV RBC AUTO: 88.2 FL (ref 74–97)
MONOCYTES # BLD: 0.4 K/UL (ref 0.05–1.2)
MONOCYTES NFR BLD: 5 % (ref 3–10)
NEUTS SEG # BLD: 5.1 K/UL (ref 1.8–8)
NEUTS SEG NFR BLD: 72 % (ref 40–73)
PLATELET # BLD AUTO: 303 K/UL (ref 135–420)
PMV BLD AUTO: 9.5 FL (ref 9.2–11.8)
POTASSIUM SERPL-SCNC: 4.3 MMOL/L (ref 3.5–5.5)
PROT SERPL-MCNC: 7.3 G/DL (ref 6.4–8.2)
RBC # BLD AUTO: 4.58 M/UL (ref 4.2–5.3)
SODIUM SERPL-SCNC: 142 MMOL/L (ref 136–145)
VIT B12 SERPL-MCNC: 960 PG/ML (ref 211–911)
WBC # BLD AUTO: 7.1 K/UL (ref 4.6–13.2)

## 2019-02-16 PROCEDURE — 83735 ASSAY OF MAGNESIUM: CPT

## 2019-02-16 PROCEDURE — 83540 ASSAY OF IRON: CPT

## 2019-02-16 PROCEDURE — 82607 VITAMIN B-12: CPT

## 2019-02-16 PROCEDURE — 82728 ASSAY OF FERRITIN: CPT

## 2019-02-16 PROCEDURE — 80053 COMPREHEN METABOLIC PANEL: CPT

## 2019-02-16 PROCEDURE — 36415 COLL VENOUS BLD VENIPUNCTURE: CPT

## 2019-02-16 PROCEDURE — 85025 COMPLETE CBC W/AUTO DIFF WBC: CPT

## 2019-02-16 PROCEDURE — 82306 VITAMIN D 25 HYDROXY: CPT

## 2019-02-17 LAB — 25(OH)D3 SERPL-MCNC: 40.6 NG/ML (ref 30–100)

## 2019-03-13 ENCOUNTER — TELEPHONE (OUTPATIENT)
Dept: SURGERY | Age: 44
End: 2019-03-13

## 2019-03-13 NOTE — TELEPHONE ENCOUNTER
Patients pharmacy called and stated that the patient is requesting a months supply of the Saxenda. Stated that she was given refills but INS will make her pay the same price for a 1 month supply. Please advise.      Pharmacy #[phone]

## 2019-03-13 NOTE — TELEPHONE ENCOUNTER
Spoke to pharmacy again who stated that the patient would receive the entire month supply at once instead of rx having refills. Will be less expensive for INS purposes. Received verbal from Chago Kenyon NP to jt. Verbal given to pharmacy. Vebral understanding given. Closing encounter.

## 2019-03-15 ENCOUNTER — TELEPHONE (OUTPATIENT)
Dept: SURGERY | Age: 44
End: 2019-03-15

## 2019-03-15 NOTE — TELEPHONE ENCOUNTER
Patient called and stated that the pharmacy is still trying to charge her for just one pen. I notified patient that I spoke with the pharmacy on 03/13/19 and gave a verbal order from eric for the full 3 pen quantity. I called [facility] again and spoke with Scott Saeed and verbal was given again for patient to receive 3  Pens. Verbal understanding given. Patient requested that next time the rx is written if it can be for 5, which is an entire box. I notified her that is something that would need to be discussed with eric at the time of her next refill. Patient gave verbal understanding. Closing encounter.

## 2019-04-03 DIAGNOSIS — E66.3 OVERWEIGHT (BMI 25.0-29.9): Primary | ICD-10-CM

## 2019-04-03 DIAGNOSIS — Z72.4 INAPPROPRIATE DIET AND EATING HABITS: ICD-10-CM

## 2019-04-03 DIAGNOSIS — Z71.3 WEIGHT LOSS COUNSELING, ENCOUNTER FOR: ICD-10-CM

## 2019-05-17 ENCOUNTER — OFFICE VISIT (OUTPATIENT)
Dept: SURGERY | Age: 44
End: 2019-05-17

## 2019-05-17 VITALS
OXYGEN SATURATION: 100 % | HEART RATE: 82 BPM | DIASTOLIC BLOOD PRESSURE: 78 MMHG | RESPIRATION RATE: 18 BRPM | TEMPERATURE: 97.3 F | WEIGHT: 177 LBS | BODY MASS INDEX: 26.83 KG/M2 | SYSTOLIC BLOOD PRESSURE: 116 MMHG | HEIGHT: 68 IN

## 2019-05-17 DIAGNOSIS — E66.3 OVERWEIGHT (BMI 25.0-29.9): ICD-10-CM

## 2019-05-17 DIAGNOSIS — Z71.3 WEIGHT LOSS COUNSELING, ENCOUNTER FOR: ICD-10-CM

## 2019-05-17 DIAGNOSIS — Z72.4 INAPPROPRIATE DIET AND EATING HABITS: ICD-10-CM

## 2019-05-17 NOTE — PATIENT INSTRUCTIONS
Information Regarding Dietary Services Through [facility]    Theresa Raymond RD sees patients at the [facility] clinic every Tuesday, Thursdays, and every other Wednesday. The clinic information is [address], [place], [address] ph [phone] and fax [phone]. Latrice Lynn RD sees patients at the [facility] at [address], [address], [place], [address] ph [phone] and fax [phone]. She is there Tuesday-Friday each week. Your health insurance MAY cover part or all of the cost of the nutrition counseling. Coverage varies greatly among plans (even under the same company). If you would like to see if your insurance covers this service, we encourage you to contact your insurance company and ask about coverage for the following procedure codes:  08627 (Nutrition evaluation) and 70719 (Nutrition Follow Up). If you do have some coverage and you would like us to file a claim for you, we then would need for you to have your physician/provider fax us a referral (can be written on a prescription pad or his/her office letterhead) to one of our clinics. Be sure to have your physician list any nutritionally related diagnoses that may apply to you such as diabetes, obesity or overweight, hypertension, high cholesterol, eating disorder, etc  Be sure also to make sure that the referral has contact information for you, as well. From there, our staff will call you to get your insurance information and schedule an appointment for you if you would like to proceed. If you do not care to go through your insurance (or find out that you do not have coverage for nutrition counseling) you can meet with one of the dietitians and pay out of pocket. The current fees for this are $96 for the initial one hour evaluation visit and $48 for any subsequent follow up visits.   If you decide to pay out of pocket, a physician referral is not necessary and you may call the number at the clinic that is convenient for you to set up an appointment. Hans Lopez [place] Locations:   [facility] [place] phone: [phone], fax: [phone] [facility]): phone: [phone] fax: [phone] [facility]): phone: [phone], fax: [phone] [place]): phone: [phone] fax: [phone]  [facility]): phone [phone] fax [phone]  [facility]): phone [phone], fax [phone]  [facility] ([facility]): phone [phone], fax: [phone]      REE = 1518 kcal   (Resting energy expenditure)     MORENO = 1277 kcal/day to lose 2 lb per week

## 2019-05-17 NOTE — PROGRESS NOTES
Chief Complaint   Patient presents with    Weight Management   1. Have you been to the ER, urgent care clinic since your last visit? Hospitalized since your last visit? No    2. Have you seen or consulted any other health care providers outside of the [address] [place] [address] since your last visit? Include any pap smears or colon screening.  No

## 2019-05-17 NOTE — PROGRESS NOTES
Patient presents for follow up/ weight check. Had gastric balloon with Dr. Thao Jones and was previously managed with HUGO Ruggiero. She notes she continues to do well on Saxenda 3mg every day Sub q injection--denies SE or complications. When she restarted she did increase her dose gradually as directions instructed and has mild nausea that was tolerable. She has resumed diet and exercise habits that are appropriate. 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 She desires a refill for Saxenda. Past Medical History:   Diagnosis Date    Thyroid disease     hashimotos     Patient Active Problem List   Diagnosis Code    Overweight (BMI 25.0-29. 9) E66.3     Weight Metrics 5/17/2019 2/11/2019 4/13/2018 12/20/2017 11/3/2017 9/11/2017 8/25/2017   Weight 177 lb 178 lb 173 lb 170 lb 170 lb 178 lb 175 lb   BMI 26.91 kg/m2 27.06 kg/m2 26.3 kg/m2 25.85 kg/m2 25.85 kg/m2 27.06 kg/m2 26.61 kg/m2     Current Outpatient Medications on File Prior to Visit   Medication Sig Dispense Refill    OTHER ferritin      multivitamin (ONE A DAY) tablet Take 1 Tab by mouth daily.  cholecalciferol, vitamin D3, 2,000 unit tab Take 6,000 Units by mouth daily. No current facility-administered medications on file prior to visit. Review of Systems   Gastrointestinal: Negative for abdominal pain, constipation, diarrhea, heartburn, nausea and vomiting. All other systems reviewed and are negative. Visit Vitals  /78   Pulse 82   Temp 97.3 °F (36.3 °C)   Resp 18   Ht 5' 8\" (1.727 m)   Wt 80.3 kg (177 lb)   SpO2 100%   BMI 26.91 kg/m²     Physical Exam   Constitutional: She is oriented to person, place, and time and well-developed, well-nourished, and in no distress. HENT:   Head: Normocephalic. Cardiovascular: Normal rate and normal heart sounds. Pulmonary/Chest: Effort normal and breath sounds normal.   Abdominal: Soft. Bowel sounds are normal. She exhibits no distension. There is no tenderness. Musculoskeletal: Normal range of motion. Neurological: She is alert and oriented to person, place, and time. Skin: Skin is warm and dry. Psychiatric: Affect normal.   Nursing note and vitals reviewed. A/P: Doing well on Saxenda long term s/p Intragastric balloon placement and removal. Cont low CHO diet and exercise 150 minutes/wk. Sent rf Saxenda 3mg every day to pt's pharmacy. Refer to registered dietitian for more detailed medical nutrition therapy. Body comp preformed today for BF % analysis, see media.        Counseling>50% of 30 minute visit      F/u 1-3 months, sooner kris Eli NP

## 2019-06-21 ENCOUNTER — DOCUMENTATION ONLY (OUTPATIENT)
Dept: BARIATRICS/WEIGHT MGMT | Age: 44
End: 2019-06-21

## 2019-09-06 ENCOUNTER — OFFICE VISIT (OUTPATIENT)
Dept: SURGERY | Age: 44
End: 2019-09-06

## 2019-09-06 VITALS
DIASTOLIC BLOOD PRESSURE: 80 MMHG | OXYGEN SATURATION: 100 % | HEART RATE: 76 BPM | TEMPERATURE: 98.4 F | SYSTOLIC BLOOD PRESSURE: 124 MMHG | RESPIRATION RATE: 18 BRPM | HEIGHT: 68 IN | BODY MASS INDEX: 28.04 KG/M2 | WEIGHT: 185 LBS

## 2019-09-06 DIAGNOSIS — Z71.3 DIETARY COUNSELING: ICD-10-CM

## 2019-09-06 DIAGNOSIS — E66.3 OVERWEIGHT (BMI 25.0-29.9): Primary | ICD-10-CM

## 2019-09-06 NOTE — PROGRESS NOTES
Pt presents weight management taking saxenda feels like she is no longer losing weight but now at standstill

## 2019-09-06 NOTE — PROGRESS NOTES
[facility] Progress Note:   Follow up Provider Visit          Patient presents for follow up/ weight check. Had gastric balloon with Dr. Tj Palmer and is s/p removal.   Patient has gained 8 pound since previous visit. Has been on Saxenda since beginning of this year. Expresses she is going to start strict [place]ot diet on 9/9/19. She present today to discuss Saxenda vacation. Weight Metrics 9/6/2019 5/17/2019 2/11/2019 4/13/2018 12/20/2017 11/3/2017 9/11/2017   Weight 185 lb 177 lb 178 lb 173 lb 170 lb 170 lb 178 lb   BMI 28.13 kg/m2 26.91 kg/m2 27.06 kg/m2 26.3 kg/m2 25.85 kg/m2 25.85 kg/m2 27.06 kg/m2         Current Outpatient Medications   Medication Sig Dispense Refill    OTHER ferritin      multivitamin (ONE A DAY) tablet Take 1 Tab by mouth daily.  cholecalciferol, vitamin D3, 2,000 unit tab Take 5,000 Units by mouth daily.  liraglutide (SAXENDA) 3 mg/0.5 mL (18 mg/3 mL) pen 0.5 mL by SubCUTAneous route daily.  15 Each 3    pen needle, diabetic (NOVOFINE PLUS) 32 gauge x 1/6\" ndle Needle to be used for rx of saxenda 100 Pen Needle 1           Positive in BOLD  CONST: Fever, weight loss, fatigue or chills  GI: Nausea, vomiting, abdominal pain, change in bowel habits, hematochezia, melena, and GERD   INTEG: Dermatitis, abnormal moles  HEENT: Recent changes in vision, vertigo, epistaxis, dysphagia and hoarseness  CV: Chest pain, palpitations, HTN, edema and varicosities  RESP: Cough, shortness of breath, wheezing, hemoptysis, snoring and reactive airway disease  : Hematuria, dysuria, frequency, urgency, nocturia and stress urinary incontinence   MS: Weakness, joint pain and arthritis  ENDO: Diabetes, thyroid disease, polyuria, polydipsia, polyphagia, poor wound healing, heat intolerance, cold intolerance  LYMPH/HEME: Anemia, bruising and history of blood transfusions  NEURO: Dizziness, headache, fainting, seizures and stroke  PSYCH: Anxiety and depression        If so, is there any Chest Pain, Palpitations or Dizziness? BP Readings from Last 10 Encounters:   09/06/19 124/80   05/17/19 116/78   02/11/19 127/79   04/13/18 132/68   12/20/17 112/78   11/03/17 102/74   09/11/17 112/82   08/25/17 100/72   07/28/17 128/82   06/26/17 110/70             Review of Systems  Complete ROS negative except where noted above    Objective  Visit Vitals  /80   Pulse 76   Temp 98.4 °F (36.9 °C)   Resp 18   Ht 5' 8\" (1.727 m)   Wt 83.9 kg (185 lb)   SpO2 100%   BMI 28.13 kg/m²     No LMP recorded. No flowsheet data found. Waist Circumference: I personally reviewed patient's Weight Management Doc Flowsheet  Neck Circumference: I personally reviewed patient's Weight Management Doc Flowsheet  Percent Body Fat: I personally reviewed patient's Weight Management Doc Flowsheet    Physical Exam     General: 45 y.o.) female in no acute distress. HEENT: Normocephalic, atraumatic, Pupils equal and reactive, nasopharynx clear, oropharynx clear and moist without lesions  NECK: Supple, no lymphadenopathy, thyromegaly, carotid bruits or jugular venous distension. trachea midline  RESP: Clear to auscultation bilaterally, no wheezes, rhonchi, or rales, normal respiratory excursion  CV: Regular rate and rhythm, no murmurs, rubs or gallops. 3+/4 pulses in bilateral dorsalis pedis and posterior tibialis. No distal edema or varicosities. ABD: Soft, nontender, nondistended, normoactive bowel sounds, no hernias, no hepatosplenomegaly  Extremities: Warm, well perfused, no tenderness or swelling, normal gait/station  Neuro: Sensation and strength grossly intact and symmetrical  Psych: Alert and oriented to person, place, and time. Assessment / Plan    Encounter Diagnoses   Name Primary?  Overweight (BMI 25.0-29. 9) Yes    BMI 28.0-28.9,adult        A/P: s/p Intragastric balloon placement and removal.   Recommend Saxenda vacation for minimum of 6 weeks  Patient to start strict Keto diet on 9/9/19  F/U with Cata Villatoro in 6 weeks with labs prior to visit         Follow-up and Dispositions    · Return in about 6 weeks (around 10/18/2019).           >50% of 30 minute visit spent face to face time with Michelle Sessions consisted of counseling & coordinating and/or discussing treatment plans in reference to her The primary encounter diagnosis was Overweight (BMI 25.0-29.9). A diagnosis of BMI 28.0-28.9,adult was also pertinent to this visit.             Gian Castellanos, [id number]

## 2019-11-22 ENCOUNTER — IMPORTED ENCOUNTER (OUTPATIENT)
Dept: URBAN - METROPOLITAN AREA CLINIC 1 | Facility: CLINIC | Age: 44
End: 2019-11-22

## 2019-11-22 PROBLEM — H44.23: Noted: 2019-11-22

## 2019-11-22 PROCEDURE — S0621 ROUTINE OPHTHALMOLOGICAL EXA: HCPCS

## 2019-11-22 NOTE — PATIENT DISCUSSION
1. Myopia -- Rx was given for correction if indicated and requested. 2. Dry Eye OU -- Cont routine use of AT's up to QID. Finalized CTL Rx today. Return for an appointment in 1 year for 40/cc with Dr. William Dominguez.

## 2020-03-27 ENCOUNTER — VIRTUAL VISIT (OUTPATIENT)
Dept: SURGERY | Age: 45
End: 2020-03-27

## 2020-03-27 VITALS — WEIGHT: 200 LBS | HEIGHT: 68 IN | BODY MASS INDEX: 30.31 KG/M2

## 2020-03-27 DIAGNOSIS — Z71.3 WEIGHT LOSS COUNSELING, ENCOUNTER FOR: ICD-10-CM

## 2020-03-27 DIAGNOSIS — Z71.3 DIETARY COUNSELING: ICD-10-CM

## 2020-03-27 DIAGNOSIS — E66.9 OBESITY (BMI 30-39.9): Primary | ICD-10-CM

## 2020-03-27 NOTE — PROGRESS NOTES
Consent:  Jodee Bustamante and/or her healthcare decision maker is aware that this patient-initiated Telehealth encounter is a billable service, with coverage as determined by her insurance carrier. She is aware that she may receive a bill and has provided verbal consent to proceed: Yes    Provider location while conducting visit: in the office, with virtual platform: [email or web address] me   Patient location: Home  Other person participating in the telehealth services: None   Visit start: 97 70 84   Visit end: [address] Weight Loss Follow up     Jodee Bustamante is a 45 y.o. female is now 4 years status post placment and expant of OBERRA intra-gastric balloon by Dr. Augusto Reyes who was seen by synchronous (real-time) audio-video technology on 3/27/2020. Currently on a regular/keto diet without difficulty but gaining weight. She is exercising 4-5 days for 60mins. She has been off [place] during this time and is requesting to restart phentermine which she has taken in the past with good results. She feels the Saxenda stopped working for her. At this time she is experiencing cravings and hunger.    Preop wt: 208lbs   Wt jamil: 167lbs   Total wt loss: -8lbs     Weight Loss Metrics 3/27/2020 9/6/2019 5/17/2019 2/11/2019 4/13/2018 12/20/2017 11/3/2017   Pre op / Initial Wt - - - - - - -   Today's Wt 200 lb 185 lb 177 lb 178 lb 173 lb 170 lb 170 lb   BMI 30.41 kg/m2 28.13 kg/m2 26.91 kg/m2 27.06 kg/m2 26.3 kg/m2 25.85 kg/m2 25.85 kg/m2   Ideal Body Wt - - - - - - -   Excess Body Wt - - - - - - -   Goal Wt - - - - - - -   Wt loss to date - - - - - - -   % Wt Loss - - - - - - -   80% EBW - - - - - - -       Past Medical History:   Diagnosis Date    Thyroid disease     hashimotos       Past Surgical History:   Procedure Laterality Date    BREAST SURGERY PROCEDURE UNLISTED      augmentation 2005    HX BREAST REDUCTION      11-21-17 removal of right breast implant    HX GYN      myomectomy    HX OTHER SURGICAL  04/14/2016    balloon EGD    STOMACH SURGERY PROCEDURE UNLISTED N/A 10/14/2016    Dr. Elizabeth Resendiz       Current Outpatient Medications   Medication Sig Dispense Refill    multivitamin (ONE A DAY) tablet Take 1 Tab by mouth daily.  cholecalciferol, vitamin D3, 2,000 unit tab Take 5,000 Units by mouth daily. No Known Allergies    ROS:  Review of Systems   Constitutional: Negative for chills and fever. Weight gain    Cardiovascular: Negative for chest pain and palpitations. Gastrointestinal: Negative for abdominal pain, constipation, diarrhea, heartburn, nausea and vomiting. Neurological: Negative for dizziness and headaches. All other systems reviewed and are negative. Physicial Exam:  Visit Vitals  Ht 5' 8\" (1.727 m)   Wt 90.7 kg (200 lb)   BMI 30.41 kg/m²    (As obtained by patient/caregiver at home)    Physical Exam  Vitals signs reviewed. Constitutional:       Appearance: She is obese. HENT:      Head: Normocephalic and atraumatic. Pulmonary:      Effort: Pulmonary effort is normal.   Neurological:      Mental Status: She is alert and oriented to person, place, and time. Psychiatric:         Mood and Affect: Mood and affect normal.       Labs: EKG and labs pending     Assessment/Plan: TodayTip is  Height: 5' 8\" (172.7 cm) tall, Weight: 90.7 kg (200 lb) lbs for a Body mass index is 30.41 kg/m². and are suffering from obesity . They are currently 4 years s/p placment and expant of OBERRA intra-gastric balloon with a total weight loss of 8lbs to date, struggling with weight gain. Labs were oredered and pt was instructed to have preformed prior to starting pharmacology treatment. Stressed importance of hydration with SF clear liquids until urine clear. Continue with food content mainly protein veggies-- ketogenic nutritional changes advised. Lengthy disc re: CHO metabolism, cravings, BS changes, urged pt to avoid CHO foods. Advised exercise program of 150 mins per week.    Pt desires start phentermine--reviewed potential SE including: elevated HR, BP, increased anxiety, insomnia, constipation  Will need labs and EKG prior to start, physical exam with us or PCP, and monthly follow up. Patient communicates understanding -- will advise us when she has these done. Refer to registered dietitian for more detailed medical nutrition therapy. The primary encounter diagnosis was Obesity (BMI 30-39.9). Diagnoses of BMI 30.0-30.9,adult, Weight loss counseling, encounter for, and Dietary counseling were also pertinent to this visit. Services were provided through a video synchronous discussion virtually to substitute for in-person clinic visit. Pursuant to the emergency declaration under the [facility], [address] waiver authority and the [employer] and [employer] Act, this Virtual  Visit was conducted, with patient's consent, to reduce the patient's risk of exposure to COVID-19 and provide continuity of care for an established patient.     Dale Kenney NP

## 2020-04-06 NOTE — PATIENT INSTRUCTIONS
Information Regarding Dietary Services Through [address] sees patients at the [facility] clinic every Tuesday, Thursdays, and every other Wednesday. The clinic information is [address], [place], [address] [facility] ph [phone] and fax [phone]. Marisol Ledesma RD sees patients at the [facility] at [address] [facility], [address] [facility], [address] ph [phone] and fax [phone]. She is there Tuesday-Friday each week. Your health insurance MAY cover part or all of the cost of the nutrition counseling. Coverage varies greatly among plans (even under the same company). If you would like to see if your insurance covers this service, we encourage you to contact your insurance company and ask about coverage for the following procedure codes:  43588 (Nutrition evaluation) and 74464 (Nutrition Follow Up). If you do have some coverage and you would like us to file a claim for you, we then would need for you to have your physician/provider fax us a referral (can be written on a prescription pad or his/her office letterhead) to one of our clinics. Be sure to have your physician list any nutritionally related diagnoses that may apply to you such as diabetes, obesity or overweight, hypertension, high cholesterol, eating disorder, etc  Be sure also to make sure that the referral has contact information for you, as well. From there, our staff will call you to get your insurance information and schedule an appointment for you if you would like to proceed. If you do not care to go through your insurance (or find out that you do not have coverage for nutrition counseling) you can meet with one of the dietitians and pay out of pocket. The current fees for this are $96 for the initial one hour evaluation visit and $48 for any subsequent follow up visits.   If you decide to pay out of pocket, a physician referral is not necessary and you may call the number at the clinic that is convenient for you to set up an appointment. [facility] [place] [facility] Locations: [facility], [facility]. [facility]): phone: [phone], fax: [phone] [facility]): phone: [phone] fax: [phone] [facility]): phone: [phone], fax: [phone] [facility]): phone: [phone] fax: [phone] [facility]): phone [phone] fax [phone] [employer] [address] [place] [address]): phone [phone], fax [phone] [address] ([facility]): phone [phone], fax: [phone]

## 2020-11-20 ENCOUNTER — IMPORTED ENCOUNTER (OUTPATIENT)
Dept: URBAN - METROPOLITAN AREA CLINIC 1 | Facility: CLINIC | Age: 45
End: 2020-11-20

## 2020-11-20 PROBLEM — H44.23: Noted: 2020-11-20

## 2020-11-20 PROBLEM — H52.4: Noted: 2020-11-20

## 2020-11-20 PROCEDURE — S0621 ROUTINE OPHTHALMOLOGICAL EXA: HCPCS

## 2020-11-20 NOTE — PATIENT DISCUSSION
1.  High Myopia -- Rx was given for correction if indicated and requested. 2. Presbyopia 3. TRINIDAD w/ PEK OU -- Recommend increase use of ATs to BID OU routinely. 4. CTL wearer -- Finalized CTL Rx today. Discussed OTC 1.00 readers to help with nva when patient's CTLs in. Can consider MF CTLs in future PRN. Return for an appointment in 1 year for a 40/cc with Dr. Xuan Britt.

## 2021-03-05 ENCOUNTER — IMPORTED ENCOUNTER (OUTPATIENT)
Dept: URBAN - METROPOLITAN AREA CLINIC 1 | Facility: CLINIC | Age: 46
End: 2021-03-05

## 2021-03-05 PROBLEM — H10.45: Noted: 2021-03-05

## 2021-03-05 PROBLEM — H04.123: Noted: 2021-03-05

## 2021-03-05 PROBLEM — H16.143: Noted: 2021-03-05

## 2021-03-05 PROCEDURE — 99213 OFFICE O/P EST LOW 20 MIN: CPT

## 2021-03-05 NOTE — PATIENT DISCUSSION
1.  TRINIDAD w/ PEK OU - Recommend ATs TID OU routinely (sample of Refresh for Contacts given). Consider adding Xiddra/Restasis w/o improvement and/or symptoms worsen. 2.  Allergic Conjunctivitis OU - The condition was  discussed with the patient. Avoidance of allergens and cool compresses were recommended. Recommend Pataday OU PRN for itching (coupon given). Begin Alrex BID OU (sample given). 3. CTL wearer - Advised pt to stay out of CTLs until next Tuesday (pt has a business trip where she needs to wear CTLs) then she may resume. **Consider changing to daily disposable contact lenses**Return for an appointment in 1 month 30/cc(if needed)/ tear lab with Dr. Stephy Bean.

## 2021-04-19 ENCOUNTER — IMPORTED ENCOUNTER (OUTPATIENT)
Dept: URBAN - METROPOLITAN AREA CLINIC 1 | Facility: CLINIC | Age: 46
End: 2021-04-19

## 2021-04-19 PROBLEM — H16.143: Noted: 2021-04-19

## 2021-04-19 PROBLEM — H10.45: Noted: 2021-04-19

## 2021-04-19 PROBLEM — H43.312: Noted: 2021-04-19

## 2021-04-19 PROBLEM — H04.123: Noted: 2021-04-19

## 2021-04-19 PROCEDURE — 92014 COMPRE OPH EXAM EST PT 1/>: CPT

## 2021-04-19 NOTE — PATIENT DISCUSSION
1.  TRINIDAD w/ PEK OU -- Cont ATs TID-QID OU routinely. Consider Xiidra/Restasis without improvement. 2.  Allergic Conjunctivitis OU -- Cont Pataday QD OU PRN itching. 3.  Vitreous Strands OS -- RD Precautions. CTL Trials given to patient (DT1). Switching brands secondary to dryness. Patient deferred MRx at today's visit. Return for an appointment in 1 week cc with Dr. Rj Mcintosh.

## 2021-05-03 ENCOUNTER — IMPORTED ENCOUNTER (OUTPATIENT)
Dept: URBAN - METROPOLITAN AREA CLINIC 1 | Facility: CLINIC | Age: 46
End: 2021-05-03

## 2021-05-03 NOTE — PATIENT DISCUSSION
1. CC Today -- Finalized CTL Rx and given to patient (DT1). Good comfort fit and vision. Finalized Dailies Colors CTL Rx [facility]) per patient request. Return for an appointment in April 30/cc with Dr. Griselda Andre.

## 2022-04-08 ASSESSMENT — TONOMETRY
OD_IOP_MMHG: 15
OD_IOP_MMHG: 16
OS_IOP_MMHG: 15
OS_IOP_MMHG: 17
OS_IOP_MMHG: 17
OS_IOP_MMHG: 16
OS_IOP_MMHG: 15
OD_IOP_MMHG: 17
OD_IOP_MMHG: 14
OD_IOP_MMHG: 17
OS_IOP_MMHG: 17
OD_IOP_MMHG: 15

## 2022-04-08 ASSESSMENT — VISUAL ACUITY
OD_SC: 20/20-2
OD_CC: J1
OD_SC: 20/20
OD_SC: 20/20-2
OS_SC: 20/25
OS_SC: 20/20
OS_SC: 20/20-1
OS_SC: 20/20
OD_SC: 20/20
OS_CC: J1
OS_SC: 20/25
OD_SC: 20/20
OS_SC: 20/20-1
OU_CC: J1
OS_SC: 20/30
OU_SC: 20/20

## 2022-07-01 NOTE — PATIENT DISCUSSION
Continue Restasis BID OU and ATs TID OU, routinely. **Pt followed by Dr. Hector [facility]) for medical appts.